# Patient Record
Sex: FEMALE | Race: WHITE | URBAN - METROPOLITAN AREA
[De-identification: names, ages, dates, MRNs, and addresses within clinical notes are randomized per-mention and may not be internally consistent; named-entity substitution may affect disease eponyms.]

---

## 2018-10-18 ENCOUNTER — OFFICE VISIT (OUTPATIENT)
Dept: OTOLARYNGOLOGY | Facility: CLINIC | Age: 73
End: 2018-10-18
Payer: COMMERCIAL

## 2018-10-18 ENCOUNTER — OFFICE VISIT (OUTPATIENT)
Dept: AUDIOLOGY | Facility: CLINIC | Age: 73
End: 2018-10-18
Payer: COMMERCIAL

## 2018-10-18 VITALS
WEIGHT: 197 LBS | BODY MASS INDEX: 33.81 KG/M2 | HEART RATE: 70 BPM | DIASTOLIC BLOOD PRESSURE: 82 MMHG | OXYGEN SATURATION: 98 % | SYSTOLIC BLOOD PRESSURE: 132 MMHG

## 2018-10-18 DIAGNOSIS — H90.A32 MIXED CONDUCTIVE AND SENSORINEURAL HEARING LOSS OF LEFT EAR WITH RESTRICTED HEARING OF RIGHT EAR: Primary | ICD-10-CM

## 2018-10-18 DIAGNOSIS — R42 INTERMITTENT VERTIGO: ICD-10-CM

## 2018-10-18 DIAGNOSIS — R42 DIZZINESS: ICD-10-CM

## 2018-10-18 DIAGNOSIS — H90.3 SNHL (SENSORY-NEURAL HEARING LOSS), ASYMMETRICAL: Primary | ICD-10-CM

## 2018-10-18 DIAGNOSIS — H81.02 MENIERE'S DISEASE, LEFT: ICD-10-CM

## 2018-10-18 PROCEDURE — 99207 ZZC NO CHARGE LOS: CPT | Performed by: AUDIOLOGIST

## 2018-10-18 PROCEDURE — 92567 TYMPANOMETRY: CPT | Performed by: AUDIOLOGIST

## 2018-10-18 PROCEDURE — 92557 COMPREHENSIVE HEARING TEST: CPT | Performed by: AUDIOLOGIST

## 2018-10-18 PROCEDURE — 99204 OFFICE O/P NEW MOD 45 MIN: CPT | Performed by: OTOLARYNGOLOGY

## 2018-10-18 RX ORDER — CYANOCOBALAMIN 1000 UG/ML
1000 INJECTION, SOLUTION INTRAMUSCULAR; SUBCUTANEOUS
COMMUNITY
Start: 2015-06-23

## 2018-10-18 RX ORDER — MECLIZINE HYDROCHLORIDE 25 MG/1
25 TABLET ORAL 3 TIMES DAILY PRN
Qty: 40 TABLET | Refills: 1 | Status: SHIPPED | OUTPATIENT
Start: 2018-10-18

## 2018-10-18 NOTE — PATIENT INSTRUCTIONS
Scheduling Information  To schedule your CT/MRI scan, please contact [facility] at [phone] OR [facility] at [phone]    To schedule your Surgery, please contact our Specialty Schedulers at [phone]      ENT Clinic Locations Clinic Hours Telephone Number     [facility]  [address] [place] [address]   Monday:           1:00pm -- 5:00pm    Friday:              8:00am - 12:00pm   To schedule/reschedule an appointment with   Dr. Wilkerson,   please contact our   Specialty Scheduling Department at:     [phone]       [facility]  [address] [place] [address] Tuesday:          8:00am -- 2:00pm         Urgent Care Locations Clinic Hours Telephone Numbers     [facility]  [address] [place] [address]     Monday-Friday:     11:00am - 9:00pm    Saturday-Sunday:  9:00am - 5:00pm   [phone]     [facility]  [address] [place] [address]     Monday-Friday:      5:00pm - 9:00pm     Saturday-Sunday:  9:00am - 5:00pm   [phone]

## 2018-10-18 NOTE — MR AVS SNAPSHOT
After Visit Summary   10/18/2018    Gloria Reyes    MRN: [id number]           Patient Information     Date Of Birth          1945        Visit Information        Provider Department      10/18/2018 2:00 PM Micheal Wilkerson MD [facility]        Today's Diagnoses     SNHL (sensory-neural hearing loss), asymmetrical    -  1    Dizziness        Meniere's disease, left          Care Instructions    Scheduling Information  To schedule your CT/MRI scan, please contact [facility] at [phone] OR [facility] at [phone]    To schedule your Surgery, please contact our Specialty Schedulers at [phone]      ENT Clinic Locations Clinic Hours Telephone Number     [facility]  [address] [place] [address]   Monday:           1:00pm -- 5:00pm    Friday:              8:00am - 12:00pm   To schedule/reschedule an appointment with   Dr. Wilkerson,   please contact our   Specialty Scheduling Department at:     [phone]       [facility]  [address]  [place] [address] Tuesday:          8:00am -- 2:00pm         Urgent Care Locations Clinic Hours Telephone Numbers     [facility]  [address]  [place] [address]     Monday-Friday:     11:00am - 9:00pm    Saturday-Sunday:  9:00am - 5:00pm   [phone]     [facility]  [address] [facility] [place] [address]     Monday-Friday:      5:00pm - 9:00pm     Saturday-Sunday:  9:00am - 5:00pm   [phone]                 Follow-ups after your visit        Additional Services     AUDIOLOGY ADULT REFERRAL       Your provider has referred you to: [facility]: [facility] [phone]   [email or web address]    Treatment:  Evaluation & Treatment  Specialty Testing:  Audiogram w/Tymps and Reflexes    Please be aware that coverage of these services is subject to the terms and limitations of your health insurance plan.  Call member  services at your health plan with any benefit or coverage questions.      Please bring the following to your appointment:  >>   Any x-rays, CTs or MRIs which have been performed.  Contact the facility where they were done to arrange for  prior to your scheduled appointment.   >>   List of current medications  >>   This referral request   >>   Any documents/labs given to you for this referral                  Who to contact     If you have questions or need follow up information about today's clinic visit or your schedule please contact [facility] JOSE LUIS directly at [phone].  Normal or non-critical lab and imaging results will be communicated to you by MyChart, letter or phone within 4 business days after the clinic has received the results. If you do not hear from us within 7 days, please contact the clinic through MyChart or phone. If you have a critical or abnormal lab result, we will notify you by phone as soon as possible.  Submit refill requests through [employer] or call your pharmacy and they will forward the refill request to us. Please allow 3 business days for your refill to be completed.          Additional Information About Your Visit        Care EveryWhere ID     This is your Care EveryWhere ID. This could be used by other organizations to access your [place] medical records  [id number]        Your Vitals Were     Pulse Pulse Oximetry BMI (Body Mass Index)             70 98% 33.81 kg/m2          Blood Pressure from Last 3 Encounters:   10/18/18 132/82   05/11/15 138/88   01/22/15 133/79    Weight from Last 3 Encounters:   10/18/18 89.4 kg (197 lb)   05/11/15 83 kg (183 lb)   01/22/15 88 kg (194 lb)              We Performed the Following     AUDIOLOGY ADULT REFERRAL          Today's Medication Changes          These changes are accurate as of 10/18/18  3:07 PM.  If you have any questions, ask your nurse or doctor.               Start taking these medicines.        Dose/Directions     meclizine 25 MG tablet   Commonly known as:  ANTIVERT   Used for:  Dizziness   Started by:  Micheal Wilkerson MD        Dose:  25 mg   Take 1 tablet (25 mg) by mouth 3 times daily as needed for dizziness   Quantity:  40 tablet   Refills:  1         These medicines have changed or have updated prescriptions.        Dose/Directions    metFORMIN 1000 MG tablet   Commonly known as:  GLUCOPHAGE   This may have changed:  Another medication with the same name was removed. Continue taking this medication, and follow the directions you see here.   Used for:  SNHL (sensory-neural hearing loss), asymmetrical, Dizziness, Meniere's disease, left   Changed by:  Micheal Wilkerson MD        TK 1 T PO BID WITH MEALS   Refills:  0            Where to get your medicines      These medications were sent to [id number] - [facility], [place] - [address] AT [facility] & [address], [place] [facility] [address]     Phone:  [phone]     meclizine 25 MG tablet                Primary Care Provider    Provider Not In System                Equal Access to Services     QUETA CRUZ AH: Hadii jory ku hadasho Soomaali, waaxda luqadaha, qaybta kaalmada adeegyada, waxay leticia haykelsea bautista . So [facility] [phone].    ATENCIÓN: Si habla español, tiene a deutsch disposición servicios gratuitos de asistencia lingüística. Ll[facility] Medical [facility] [phone].    We comply with applicable federal civil rights laws and [place] laws. We do not discriminate on the basis of race, color, national origin, age, disability, sex, sexual orientation, or gender identity.            Thank you!     Thank you for choosing [facility]  for your care. Our goal is always to provide you with excellent care. Hearing back from our patients is one way we can continue to improve our services. Please take a few minutes to complete the written survey that you may receive in the mail after your visit with us. Thank you!              Your Updated Medication List - Protect others around you: Learn how to safely use, store and throw away your medicines at www.disposemymeds.org.          This list is accurate as of 10/18/18  3:07 PM.  Always use your most recent med list.                   Brand Name Dispense Instructions for use Diagnosis    atenolol 50 MG tablet    TENORMIN     1 TABLET DAILY        CITALOPRAM HYDROBROMIDE PO      Take 10 mg by mouth        cyanocobalamin 1000 MCG/ML injection    VITAMIN B12     Inject 1,000 mcg into the muscle    SNHL (sensory-neural hearing loss), asymmetrical, Dizziness, Meniere's disease, left       LORAZEPAM PO           LOSARTAN POTASSIUM PO           meclizine 25 MG tablet    ANTIVERT    40 tablet    Take 1 tablet (25 mg) by mouth 3 times daily as needed for dizziness    Dizziness       metFORMIN 1000 MG tablet    GLUCOPHAGE     TK 1 T PO BID WITH MEALS    SNHL (sensory-neural hearing loss), asymmetrical, Dizziness, Meniere's disease, left       omeprazole 20 MG CR capsule    priLOSEC     1 CAPSULE DAILY        oxyCODONE-acetaminophen 5-325 MG per tablet    PERCOCET    20 tablet    Take 1 tablet by mouth every 4 hours as needed for moderate to severe pain    Shingles       simvastatin 40 MG tablet    ZOCOR     1 TABLET EVERY EVENING

## 2018-10-18 NOTE — MR AVS SNAPSHOT
After Visit Summary   10/18/2018    Gloria Reyes    MRN: [id number]           Patient Information     Date Of Birth          1945        Visit Information        Provider Department      10/18/2018 1:30 PM Cesar De Luna AuD [facility]        Today's Diagnoses     Mixed conductive and sensorineural hearing loss of left ear with restricted hearing of right ear    -  1    Right asymmetrical SNHL        Intermittent vertigo           Follow-ups after your visit        Who to contact     If you have questions or need follow up information about today's clinic visit or your schedule please contact [facility] directly at [phone].  Normal or non-critical lab and imaging results will be communicated to you by MyChart, letter or phone within 4 business days after the clinic has received the results. If you do not hear from us within 7 days, please contact the clinic through MyChart or phone. If you have a critical or abnormal lab result, we will notify you by phone as soon as possible.  Submit refill requests through [employer] or call your pharmacy and they will forward the refill request to us. Please allow 3 business days for your refill to be completed.          Additional Information About Your Visit        Care EveryWhere ID     This is your Care EveryWhere ID. This could be used by other organizations to access your [place] medical records  [id number]         Blood Pressure from Last 3 Encounters:   10/18/18 132/82   05/11/15 138/88   01/22/15 133/79    Weight from Last 3 Encounters:   10/18/18 197 lb (89.4 kg)   05/11/15 183 lb (83 kg)   01/22/15 194 lb (88 kg)              We Performed the Following     AUDIOGRAM/TYMPANOGRAM - INTERFACE     COMPREHENSIVE HEARING TEST     TYMPANOMETRY          Today's Medication Changes          These changes are accurate as of 10/18/18  3:09 PM.  If you have any questions, ask your nurse or doctor.               Start  taking these medicines.        Dose/Directions    meclizine 25 MG tablet   Commonly known as:  ANTIVERT   Used for:  Dizziness   Started by:  Micheal Wilkerson MD        Dose:  25 mg   Take 1 tablet (25 mg) by mouth 3 times daily as needed for dizziness   Quantity:  40 tablet   Refills:  1         These medicines have changed or have updated prescriptions.        Dose/Directions    metFORMIN 1000 MG tablet   Commonly known as:  GLUCOPHAGE   This may have changed:  Another medication with the same name was removed. Continue taking this medication, and follow the directions you see here.   Used for:  SNHL (sensory-neural hearing loss), asymmetrical, Dizziness, Meniere's disease, left   Changed by:  Micheal Wilkerson MD        TK 1 T PO BID WITH MEALS   Refills:  0            Where to get your medicines      These medications were sent to [facility] - [facility], [place] - [address] AT [facility] & [address] [facility] [address]     Phone:  [phone]     meclizine 25 MG tablet                Primary Care Provider    Provider Not In System                Equal Access to Services     QUETA CRUZ AH: Hadii jory cherry hadasho Soomaali, waaxda luqadaha, qaybta kaalmada adeegyada, waxperfecto bautista . So [facility] [phone].    ATENCIÓN: Si habla español, tiene a deutsch disposición servicios gratuitos de asistencia lingüística. Llame al [phone].    We comply with applicable federal civil rights laws and [place] laws. We do not discriminate on the basis of race, color, national origin, age, disability, sex, sexual orientation, or gender identity.            Thank you!     Thank you for choosing [facility]  for your care. Our goal is always to provide you with excellent care. Hearing back from our patients is one way we can continue to improve our services. Please take a few minutes to complete the written survey that you may receive in  the mail after your visit with us. Thank you!             Your Updated Medication List - Protect others around you: Learn how to safely use, store and throw away your medicines at www.disposemymeds.org.          This list is accurate as of 10/18/18  3:09 PM.  Always use your most recent med list.                   Brand Name Dispense Instructions for use Diagnosis    atenolol 50 MG tablet    TENORMIN     1 TABLET DAILY        CITALOPRAM HYDROBROMIDE PO      Take 10 mg by mouth        cyanocobalamin 1000 MCG/ML injection    VITAMIN B12     Inject 1,000 mcg into the muscle    SNHL (sensory-neural hearing loss), asymmetrical, Dizziness, Meniere's disease, left       LORAZEPAM PO           LOSARTAN POTASSIUM PO           meclizine 25 MG tablet    ANTIVERT    40 tablet    Take 1 tablet (25 mg) by mouth 3 times daily as needed for dizziness    Dizziness       metFORMIN 1000 MG tablet    GLUCOPHAGE     TK 1 T PO BID WITH MEALS    SNHL (sensory-neural hearing loss), asymmetrical, Dizziness, Meniere's disease, left       omeprazole 20 MG CR capsule    priLOSEC     1 CAPSULE DAILY        oxyCODONE-acetaminophen 5-325 MG per tablet    PERCOCET    20 tablet    Take 1 tablet by mouth every 4 hours as needed for moderate to severe pain    Shingles       simvastatin 40 MG tablet    ZOCOR     1 TABLET EVERY EVENING

## 2018-10-18 NOTE — PROGRESS NOTES
"History of Present Illness - Gloria Reyes is a 73 year old female here to see me for the first time for dizziness.    She tells me that this started about one year ago.  She got up from bed, and made it to the bathroom. She was on the toilet and all of the sudden she had spinning vertigo and vomiting and was taken by squad to the ER at [facility] and all scans were normal.    This happened again several times over the past year, but she notes that they were very brief.  The other episodes happened lying in bed.   When these episodes happen, the ears \" are popping.\"    No history of any ear disease or ear surgery, however, she did lose some hearing after her chemotherapy 3 years ago for bladder cancer.  This was treated at [facility].  She had recurrence in the lung, and she was on immunotherapy until July of 2018.    She is not aware of any blood relatives with dizziness.    Past Medical History - There is no problem list on file for this patient.      Current Medications -   Current Outpatient Prescriptions:      ATENOLOL 50 MG PO TABS, 1 TABLET DAILY, Disp: , Rfl:      CITALOPRAM HYDROBROMIDE PO, Take 10 mg by mouth, Disp: , Rfl:      cyanocobalamin (VITAMIN B12) 1000 MCG/ML injection, Inject 1,000 mcg into the muscle, Disp: , Rfl:      LOSARTAN POTASSIUM PO, , Disp: , Rfl:      metFORMIN (GLUCOPHAGE) 1000 MG tablet, TK 1 T PO BID WITH MEALS, Disp: , Rfl: 0     OMEPRAZOLE 20 MG PO CPDR, 1 CAPSULE DAILY, Disp: , Rfl:      SIMVASTATIN 40 MG PO TABS, 1 TABLET EVERY EVENING, Disp: , Rfl:      LORAZEPAM PO, , Disp: , Rfl:      oxyCODONE-acetaminophen (PERCOCET) 5-325 MG per tablet, Take 1 tablet by mouth every 4 hours as needed for moderate to severe pain (Patient not taking: Reported on 10/18/2018), Disp: 20 tablet, Rfl: 0    Allergies -   Allergies   Allergen Reactions     Belladonna      Other reaction(s): *Unknown     Levsin [Hyoscyamine]      Nickel Rash       Social History -   Social History     Social History "     Marital status:      Spouse name: N/A     Number of children: N/A     Years of education: N/A     Social History Main Topics     Smoking status: Never Smoker     Smokeless tobacco: Never Used     Alcohol use Not on file     Drug use: Not on file     Sexual activity: Not on file     Other Topics Concern     Not on file     Social History Narrative       Family History - No family history on file.    Review of Systems - As per HPI and PMHx, otherwise 10+ system review of the head and neck, and general constitution is negative.    Physical Exam  /82 (BP Location: Right arm, Cuff Size: Adult Large)  Pulse 70  Wt 89.4 kg (197 lb)  SpO2 98%  BMI 33.81 kg/m2    General - The patient is well nourished and well developed, and appears to have good nutritional status.  Alert and oriented to person and place, answers questions and cooperates with examination appropriately.   Head and Face - Normocephalic and atraumatic, with no gross asymmetry noted of the contour of the facial features.  The facial nerve is intact, with strong symmetric movements.  Voice and Breathing - The patient was breathing comfortably without the use of accessory muscles. There was no wheezing, stridor, or stertor.  The patients voice was clear and strong, and had appropriate pitch and quality.  Ears - The tympanic membranes are normal in appearance, bony landmarks are intact.  No retraction, perforation, or masses.  No fluid or purulence was seen in the external canal or the middle ear. No evidence of infection of the middle ear or external canal, cerumen was normal in appearance.  Eyes - Extraocular movements intact, and the pupils were reactive to light.  Sclera were not icteric or injected, conjunctiva were pink and moist.  Mouth - Examination of the oral cavity showed pink, healthy oral mucosa. No lesions or ulcerations noted.  The tongue was mobile and midline, and the dentition were in good condition.    Throat - The walls of  the oropharynx were smooth, pink, moist, symmetric, and had no lesions or ulcerations.  The tonsillar pillars and soft palate were symmetric.  The uvula was midline on elevation.    Neck - Normal midline excursion of the laryngotracheal complex during swallowing.  Full range of motion on passive movement.  Palpation of the occipital, submental, submandibular, internal jugular chain, and supraclavicular nodes did not demonstrate any abnormal lymph nodes or masses.  The carotid pulse was palpable bilaterally.  Palpation of the thyroid was soft and smooth, with no nodules or goiter appreciated.  The trachea was mobile and midline.  Nose - External contour is symmetric, no gross deflection or scars.  Nasal mucosa is pink and moist with no abnormal mucus.  The septum was midline and non-obstructive, turbinates of normal size and position.  No polyps, masses, or purulence noted on examination.  ABNORMAL Balance evaluation - The patient was able to stand independently in the room, and had slight swaying with heels together and eyes closed.  On standing heel to toe, however, the patient immediately stumbled to the RIGHT with eyes closed.  Rapid eye movements and head thrusting caused the patient to report subjective dizziness.  Subtle nystagmus was noted on prolonged lateral gaze to the RIGHT       Audiologic Studies - An audiogram and tympanogram were performed today as part of the evaluation and personally reviewed. The tympanogram shows a normal Type A curve, with normal canal volume and middle ear pressure.  There is no sign of eustachian tube dysfunction or middle ear effusion.  The audiogram was also normal.  The sensorineural hearing was age-appropriate, with no evidence of conductive hearing loss or significant asymmetry.      A/P - Gloria Reyes is a 73 year old female  (H90.5) SNHL (sensory-neural hearing loss), asymmetrical  (primary encounter diagnosis)  (R42) Dizziness  (H81.02) Meniere's disease, left    I  spent the remainder of today's visit educating the patient about the current theory on the cause of Meniere's Disease and the reasoning behind its treatment.  We discussed the CATS (Caffeine, Alcohol, Tobacco, Salt/Stress) Avoidance Diet, as well as safety measures to be done at home to avoid injury.  Finally, the variability, potential permanent hearing loss, and natural course of Meniere's disease, including 30% incidence of eventual bilateral involvement, was also discussed.    The use of Dyazide as well as Potassium replacement was also discussed as the next step in treatment.  The patient expressed understanding of today's discussion, and will follow up in 1 month.  I plan on doing audiograms every 6 months for the first 2 years, then yearly assuming they remain stable.    The last item discussed was the possible indications for an MRI scan of the brain and internal auditory canals.  Although the chances of unilateral hearing loss and vertigo eventually being diagnosed as a vestibular schwannoma or other intracranial pathology was quoted as being approximately 1 in 400, consideration of an MRI in the situation of persistent or worsening symptoms is still possible necessary.

## 2018-10-18 NOTE — LETTER
"    10/18/2018         RE: Gloria Reyes  [address] [facility] [address]        Dear Colleague,    Thank you for referring your patient, Gloria Reyes, to the [facility]. Please see a copy of my visit note below.    History of Present Illness - Gloria Reyes is a 73 year old female here to see me for the first time for dizziness.    She tells me that this started about one year ago.  She got up from bed, and made it to the bathroom. She was on the toilet and all of the sudden she had spinning vertigo and vomiting and was taken by squad to the ER at [facility] and all scans were normal.    This happened again several times over the past year, but she notes that they were very brief.  The other episodes happened lying in bed.   When these episodes happen, the ears \" are popping.\"    No history of any ear disease or ear surgery, however, she did lose some hearing after her chemotherapy 3 years ago for bladder cancer.  This was treated at [place].  She had recurrence in the lung, and she was on immunotherapy until July of 2018.    She is not aware of any blood relatives with dizziness.    Past Medical History - There is no problem list on file for this patient.      Current Medications -   Current Outpatient Prescriptions:      ATENOLOL 50 MG PO TABS, 1 TABLET DAILY, Disp: , Rfl:      CITALOPRAM HYDROBROMIDE PO, Take 10 mg by mouth, Disp: , Rfl:      cyanocobalamin (VITAMIN B12) 1000 MCG/ML injection, Inject 1,000 mcg into the muscle, Disp: , Rfl:      LOSARTAN POTASSIUM PO, , Disp: , Rfl:      metFORMIN (GLUCOPHAGE) 1000 MG tablet, TK 1 T PO BID WITH MEALS, Disp: , Rfl: 0     OMEPRAZOLE 20 MG PO CPDR, 1 CAPSULE DAILY, Disp: , Rfl:      SIMVASTATIN 40 MG PO TABS, 1 TABLET EVERY EVENING, Disp: , Rfl:      LORAZEPAM PO, , Disp: , Rfl:      oxyCODONE-acetaminophen (PERCOCET) 5-325 MG per tablet, Take 1 tablet by mouth every 4 hours as needed for moderate to severe pain (Patient not " taking: Reported on 10/18/2018), Disp: 20 tablet, Rfl: 0    Allergies -   Allergies   Allergen Reactions     Belladonna      Other reaction(s): *Unknown     Levsin [Hyoscyamine]      Nickel Rash       Social History -   Social History     Social History     Marital status:      Spouse name: N/A     Number of children: N/A     Years of education: N/A     Social History Main Topics     Smoking status: Never Smoker     Smokeless tobacco: Never Used     Alcohol use Not on file     Drug use: Not on file     Sexual activity: Not on file     Other Topics Concern     Not on file     Social History Narrative       Family History - No family history on file.    Review of Systems - As per HPI and PMHx, otherwise 10+ system review of the head and neck, and general constitution is negative.    Physical Exam  /82 (BP Location: Right arm, Cuff Size: Adult Large)  Pulse 70  Wt 89.4 kg (197 lb)  SpO2 98%  BMI 33.81 kg/m2    General - The patient is well nourished and well developed, and appears to have good nutritional status.  Alert and oriented to person and place, answers questions and cooperates with examination appropriately.   Head and Face - Normocephalic and atraumatic, with no gross asymmetry noted of the contour of the facial features.  The facial nerve is intact, with strong symmetric movements.  Voice and Breathing - The patient was breathing comfortably without the use of accessory muscles. There was no wheezing, stridor, or stertor.  The patients voice was clear and strong, and had appropriate pitch and quality.  Ears - The tympanic membranes are normal in appearance, bony landmarks are intact.  No retraction, perforation, or masses.  No fluid or purulence was seen in the external canal or the middle ear. No evidence of infection of the middle ear or external canal, cerumen was normal in appearance.  Eyes - Extraocular movements intact, and the pupils were reactive to light.  Sclera were not icteric or  injected, conjunctiva were pink and moist.  Mouth - Examination of the oral cavity showed pink, healthy oral mucosa. No lesions or ulcerations noted.  The tongue was mobile and midline, and the dentition were in good condition.    Throat - The walls of the oropharynx were smooth, pink, moist, symmetric, and had no lesions or ulcerations.  The tonsillar pillars and soft palate were symmetric.  The uvula was midline on elevation.    Neck - Normal midline excursion of the laryngotracheal complex during swallowing.  Full range of motion on passive movement.  Palpation of the occipital, submental, submandibular, internal jugular chain, and supraclavicular nodes did not demonstrate any abnormal lymph nodes or masses.  The carotid pulse was palpable bilaterally.  Palpation of the thyroid was soft and smooth, with no nodules or goiter appreciated.  The trachea was mobile and midline.  Nose - External contour is symmetric, no gross deflection or scars.  Nasal mucosa is pink and moist with no abnormal mucus.  The septum was midline and non-obstructive, turbinates of normal size and position.  No polyps, masses, or purulence noted on examination.  ABNORMAL Balance evaluation - The patient was able to stand independently in the room, and had slight swaying with heels together and eyes closed.  On standing heel to toe, however, the patient immediately stumbled to the RIGHT with eyes closed.  Rapid eye movements and head thrusting caused the patient to report subjective dizziness.  Subtle nystagmus was noted on prolonged lateral gaze to the RIGHT       Audiologic Studies - An audiogram and tympanogram were performed today as part of the evaluation and personally reviewed. The tympanogram shows a normal Type A curve, with normal canal volume and middle ear pressure.  There is no sign of eustachian tube dysfunction or middle ear effusion.  The audiogram was also normal.  The sensorineural hearing was age-appropriate, with no  evidence of conductive hearing loss or significant asymmetry.      A/P - Gloria Reyes is a 73 year old female  (H90.5) SNHL (sensory-neural hearing loss), asymmetrical  (primary encounter diagnosis)  (R42) Dizziness  (H81.02) Meniere's disease, left    I spent the remainder of today's visit educating the patient about the current theory on the cause of Meniere's Disease and the reasoning behind its treatment.  We discussed the CATS (Caffeine, Alcohol, Tobacco, Salt/Stress) Avoidance Diet, as well as safety measures to be done at home to avoid injury.  Finally, the variability, potential permanent hearing loss, and natural course of Meniere's disease, including 30% incidence of eventual bilateral involvement, was also discussed.    The use of Dyazide as well as Potassium replacement was also discussed as the next step in treatment.  The patient expressed understanding of today's discussion, and will follow up in 1 month.  I plan on doing audiograms every 6 months for the first 2 years, then yearly assuming they remain stable.    The last item discussed was the possible indications for an MRI scan of the brain and internal auditory canals.  Although the chances of unilateral hearing loss and vertigo eventually being diagnosed as a vestibular schwannoma or other intracranial pathology was quoted as being approximately 1 in 400, consideration of an MRI in the situation of persistent or worsening symptoms is still possible necessary.      Again, thank you for allowing me to participate in the care of your patient.        Sincerely,        Micheal Wilkerson MD

## 2018-10-18 NOTE — PROGRESS NOTES
AUDIOLOGY REPORT:    Patient was referred to Audiology from ENT by Dr. Wilkerosn for a hearing examination. Patient reports long duration episodes of true vertigo. Patient experienced an episode over a week ago that lasted nearly the whole day, by patient report. Patient reports that there was a popping sound in her ears that accompanied the vertigo. They were accompanied today by their daughter.    Testing:    Otoscopy:   Otoscopic exam indicates partial obstruction with cerumen bilaterally     Tympanograms:    RIGHT: negative pressure -260 daPa     LEFT:   normal eardrum mobility with some negative pressure -94 daPa    Thresholds:   Pure Tone Thresholds assessed using conventional audiometry with good  reliability from 250-8000 Hz bilaterally using TDH headphones     RIGHT:  mild and severe primarily sensorineural hearing loss    LEFT:    mild and profound mixed hearing loss    NOTE: there is an air-bone gap of 20 dB at 4000 Hz only, in the right ear     Speech Reception Threshold:    RIGHT: 30 dB HL    LEFT:   40 dB HL    Word Recognition Score:     RIGHT: 92% at 80 dB HL using NU-6 recorded word list.    LEFT:   92% at 80 dB HL using NU-6 recorded word list.    Discussed results with the patient and her daughter.     Patient was returned to ENT for follow up.     Cesar Cobos CCC-A  Licensed Audiologist  10/18/2018